# Patient Record
Sex: FEMALE | Race: WHITE | Employment: OTHER | URBAN - METROPOLITAN AREA
[De-identification: names, ages, dates, MRNs, and addresses within clinical notes are randomized per-mention and may not be internally consistent; named-entity substitution may affect disease eponyms.]

---

## 2022-12-06 RX ORDER — LORATADINE 10 MG/1
10 TABLET ORAL DAILY PRN
COMMUNITY

## 2022-12-06 RX ORDER — M-VIT,TX,IRON,MINS/CALC/FOLIC 27MG-0.4MG
1 TABLET ORAL DAILY
COMMUNITY

## 2022-12-07 ENCOUNTER — ANESTHESIA EVENT (OUTPATIENT)
Dept: OPERATING ROOM | Age: 72
End: 2022-12-07
Payer: MEDICARE

## 2022-12-07 ENCOUNTER — HOSPITAL ENCOUNTER (OUTPATIENT)
Age: 72
Setting detail: OUTPATIENT SURGERY
Discharge: HOME | End: 2022-12-07
Attending: OPHTHALMOLOGY | Admitting: OPHTHALMOLOGY
Payer: MEDICARE

## 2022-12-07 ENCOUNTER — ANESTHESIA (OUTPATIENT)
Dept: OPERATING ROOM | Age: 72
End: 2022-12-07
Payer: MEDICARE

## 2022-12-07 VITALS
OXYGEN SATURATION: 92 % | SYSTOLIC BLOOD PRESSURE: 145 MMHG | RESPIRATION RATE: 14 BRPM | TEMPERATURE: 98.6 F | BODY MASS INDEX: 27.31 KG/M2 | DIASTOLIC BLOOD PRESSURE: 66 MMHG | HEIGHT: 64 IN | WEIGHT: 160 LBS | HEART RATE: 71 BPM

## 2022-12-07 PROCEDURE — 2500000003 HC RX 250 WO HCPCS: Performed by: OPHTHALMOLOGY

## 2022-12-07 PROCEDURE — 3700000001 HC ADD 15 MINUTES (ANESTHESIA): Performed by: OPHTHALMOLOGY

## 2022-12-07 PROCEDURE — 2500000003 HC RX 250 WO HCPCS

## 2022-12-07 PROCEDURE — 2580000003 HC RX 258

## 2022-12-07 PROCEDURE — 2720000010 HC SURG SUPPLY STERILE: Performed by: OPHTHALMOLOGY

## 2022-12-07 PROCEDURE — 6360000002 HC RX W HCPCS: Performed by: OPHTHALMOLOGY

## 2022-12-07 PROCEDURE — 3600000004 HC SURGERY LEVEL 4 BASE: Performed by: OPHTHALMOLOGY

## 2022-12-07 PROCEDURE — 6370000000 HC RX 637 (ALT 250 FOR IP): Performed by: OPHTHALMOLOGY

## 2022-12-07 PROCEDURE — 7100000000 HC PACU RECOVERY - FIRST 15 MIN: Performed by: OPHTHALMOLOGY

## 2022-12-07 PROCEDURE — 6360000002 HC RX W HCPCS

## 2022-12-07 PROCEDURE — 3700000000 HC ANESTHESIA ATTENDED CARE: Performed by: OPHTHALMOLOGY

## 2022-12-07 PROCEDURE — 7100000001 HC PACU RECOVERY - ADDTL 15 MIN: Performed by: OPHTHALMOLOGY

## 2022-12-07 PROCEDURE — 2709999900 HC NON-CHARGEABLE SUPPLY: Performed by: OPHTHALMOLOGY

## 2022-12-07 PROCEDURE — 2580000003 HC RX 258: Performed by: OPHTHALMOLOGY

## 2022-12-07 PROCEDURE — 3600000014 HC SURGERY LEVEL 4 ADDTL 15MIN: Performed by: OPHTHALMOLOGY

## 2022-12-07 PROCEDURE — 7100000010 HC PHASE II RECOVERY - FIRST 15 MIN: Performed by: OPHTHALMOLOGY

## 2022-12-07 RX ORDER — CYCLOPENTOLATE HYDROCHLORIDE 10 MG/ML
1 SOLUTION/ DROPS OPHTHALMIC
Status: COMPLETED | OUTPATIENT
Start: 2022-12-07 | End: 2022-12-07

## 2022-12-07 RX ORDER — SODIUM CHLORIDE 0.9 % (FLUSH) 0.9 %
SYRINGE (ML) INJECTION PRN
Status: DISCONTINUED | OUTPATIENT
Start: 2022-12-07 | End: 2022-12-07 | Stop reason: HOSPADM

## 2022-12-07 RX ORDER — SODIUM CHLORIDE, SODIUM LACTATE, POTASSIUM CHLORIDE, CALCIUM CHLORIDE 600; 310; 30; 20 MG/100ML; MG/100ML; MG/100ML; MG/100ML
INJECTION, SOLUTION INTRAVENOUS CONTINUOUS PRN
Status: DISCONTINUED | OUTPATIENT
Start: 2022-12-07 | End: 2022-12-07 | Stop reason: SDUPTHER

## 2022-12-07 RX ORDER — SODIUM CHLORIDE 0.9 % (FLUSH) 0.9 %
5-40 SYRINGE (ML) INJECTION PRN
Status: CANCELLED | OUTPATIENT
Start: 2022-12-07

## 2022-12-07 RX ORDER — FENTANYL CITRATE 50 UG/ML
INJECTION, SOLUTION INTRAMUSCULAR; INTRAVENOUS PRN
Status: DISCONTINUED | OUTPATIENT
Start: 2022-12-07 | End: 2022-12-07 | Stop reason: SDUPTHER

## 2022-12-07 RX ORDER — LABETALOL HYDROCHLORIDE 5 MG/ML
10 INJECTION, SOLUTION INTRAVENOUS
Status: CANCELLED | OUTPATIENT
Start: 2022-12-07

## 2022-12-07 RX ORDER — CYCLOPENTOLATE HYDROCHLORIDE 10 MG/ML
SOLUTION/ DROPS OPHTHALMIC PRN
Status: DISCONTINUED | OUTPATIENT
Start: 2022-12-07 | End: 2022-12-07 | Stop reason: HOSPADM

## 2022-12-07 RX ORDER — INDOCYANINE GREEN AND WATER 25 MG
KIT INJECTION PRN
Status: DISCONTINUED | OUTPATIENT
Start: 2022-12-07 | End: 2022-12-07 | Stop reason: HOSPADM

## 2022-12-07 RX ORDER — LIDOCAINE HYDROCHLORIDE 10 MG/ML
INJECTION, SOLUTION EPIDURAL; INFILTRATION; INTRACAUDAL; PERINEURAL PRN
Status: DISCONTINUED | OUTPATIENT
Start: 2022-12-07 | End: 2022-12-07 | Stop reason: SDUPTHER

## 2022-12-07 RX ORDER — PHENYLEPHRINE HYDROCHLORIDE 100 MG/ML
1 SOLUTION/ DROPS OPHTHALMIC
Status: COMPLETED | OUTPATIENT
Start: 2022-12-07 | End: 2022-12-07

## 2022-12-07 RX ORDER — DEXTROSE MONOHYDRATE 50 MG/ML
INJECTION, SOLUTION INTRAVENOUS CONTINUOUS PRN
Status: DISCONTINUED | OUTPATIENT
Start: 2022-12-07 | End: 2022-12-07 | Stop reason: HOSPADM

## 2022-12-07 RX ORDER — PROPOFOL 10 MG/ML
INJECTION, EMULSION INTRAVENOUS PRN
Status: DISCONTINUED | OUTPATIENT
Start: 2022-12-07 | End: 2022-12-07 | Stop reason: SDUPTHER

## 2022-12-07 RX ORDER — TROPICAMIDE 10 MG/ML
1 SOLUTION/ DROPS OPHTHALMIC
Status: COMPLETED | OUTPATIENT
Start: 2022-12-07 | End: 2022-12-07

## 2022-12-07 RX ORDER — WATER 1000 ML/1000ML
INJECTION, SOLUTION INTRAVENOUS PRN
Status: DISCONTINUED | OUTPATIENT
Start: 2022-12-07 | End: 2022-12-07 | Stop reason: HOSPADM

## 2022-12-07 RX ORDER — ONDANSETRON 2 MG/ML
4 INJECTION INTRAMUSCULAR; INTRAVENOUS
Status: CANCELLED | OUTPATIENT
Start: 2022-12-07 | End: 2022-12-08

## 2022-12-07 RX ORDER — BALANCED SALT SOLUTION ENRICHED WITH BICARBONATE, DEXTROSE, AND GLUTATHIONE
KIT INTRAOCULAR PRN
Status: DISCONTINUED | OUTPATIENT
Start: 2022-12-07 | End: 2022-12-07 | Stop reason: HOSPADM

## 2022-12-07 RX ORDER — VANCOMYCIN HYDROCHLORIDE 500 MG/10ML
INJECTION, POWDER, LYOPHILIZED, FOR SOLUTION INTRAVENOUS PRN
Status: DISCONTINUED | OUTPATIENT
Start: 2022-12-07 | End: 2022-12-07 | Stop reason: HOSPADM

## 2022-12-07 RX ORDER — SODIUM CHLORIDE 9 MG/ML
INJECTION, SOLUTION INTRAVENOUS PRN
Status: CANCELLED | OUTPATIENT
Start: 2022-12-07

## 2022-12-07 RX ORDER — OFLOXACIN 3 MG/ML
1 SOLUTION/ DROPS OPHTHALMIC
Status: COMPLETED | OUTPATIENT
Start: 2022-12-07 | End: 2022-12-07

## 2022-12-07 RX ORDER — TETRACAINE HYDROCHLORIDE 5 MG/ML
SOLUTION OPHTHALMIC PRN
Status: DISCONTINUED | OUTPATIENT
Start: 2022-12-07 | End: 2022-12-07 | Stop reason: HOSPADM

## 2022-12-07 RX ORDER — SODIUM CHLORIDE 0.9 % (FLUSH) 0.9 %
5-40 SYRINGE (ML) INJECTION EVERY 12 HOURS SCHEDULED
Status: CANCELLED | OUTPATIENT
Start: 2022-12-07

## 2022-12-07 RX ORDER — HYDRALAZINE HYDROCHLORIDE 20 MG/ML
10 INJECTION INTRAMUSCULAR; INTRAVENOUS
Status: CANCELLED | OUTPATIENT
Start: 2022-12-07

## 2022-12-07 RX ORDER — NEOMYCIN SULFATE, POLYMYXIN B SULFATE, AND DEXAMETHASONE 3.5; 10000; 1 MG/G; [USP'U]/G; MG/G
OINTMENT OPHTHALMIC PRN
Status: DISCONTINUED | OUTPATIENT
Start: 2022-12-07 | End: 2022-12-07 | Stop reason: HOSPADM

## 2022-12-07 RX ADMIN — PHENYLEPHRINE HYDROCHLORIDE 1 DROP: 100 SOLUTION/ DROPS OPHTHALMIC at 06:37

## 2022-12-07 RX ADMIN — TROPICAMIDE 1 DROP: 10 SOLUTION/ DROPS OPHTHALMIC at 06:37

## 2022-12-07 RX ADMIN — LIDOCAINE HYDROCHLORIDE 50 MG: 10 INJECTION, SOLUTION EPIDURAL; INFILTRATION; INTRACAUDAL; PERINEURAL at 08:16

## 2022-12-07 RX ADMIN — PHENYLEPHRINE HYDROCHLORIDE 1 DROP: 100 SOLUTION/ DROPS OPHTHALMIC at 06:43

## 2022-12-07 RX ADMIN — TROPICAMIDE 1 DROP: 10 SOLUTION/ DROPS OPHTHALMIC at 06:50

## 2022-12-07 RX ADMIN — PROPOFOL 60 MG: 10 INJECTION, EMULSION INTRAVENOUS at 08:16

## 2022-12-07 RX ADMIN — CYCLOPENTOLATE HYDROCHLORIDE 1 DROP: 10 SOLUTION/ DROPS OPHTHALMIC at 06:50

## 2022-12-07 RX ADMIN — OFLOXACIN 1 DROP: 3 SOLUTION OPHTHALMIC at 06:43

## 2022-12-07 RX ADMIN — CYCLOPENTOLATE HYDROCHLORIDE 1 DROP: 10 SOLUTION/ DROPS OPHTHALMIC at 06:43

## 2022-12-07 RX ADMIN — FENTANYL CITRATE 25 MCG: 50 INJECTION, SOLUTION INTRAMUSCULAR; INTRAVENOUS at 09:09

## 2022-12-07 RX ADMIN — TROPICAMIDE 1 DROP: 10 SOLUTION/ DROPS OPHTHALMIC at 06:43

## 2022-12-07 RX ADMIN — CYCLOPENTOLATE HYDROCHLORIDE 1 DROP: 10 SOLUTION/ DROPS OPHTHALMIC at 06:37

## 2022-12-07 RX ADMIN — SODIUM CHLORIDE, POTASSIUM CHLORIDE, SODIUM LACTATE AND CALCIUM CHLORIDE: 600; 310; 30; 20 INJECTION, SOLUTION INTRAVENOUS at 08:05

## 2022-12-07 RX ADMIN — FENTANYL CITRATE 25 MCG: 50 INJECTION, SOLUTION INTRAMUSCULAR; INTRAVENOUS at 09:03

## 2022-12-07 RX ADMIN — PHENYLEPHRINE HYDROCHLORIDE 1 DROP: 100 SOLUTION/ DROPS OPHTHALMIC at 06:50

## 2022-12-07 RX ADMIN — PROPOFOL 40 MG: 10 INJECTION, EMULSION INTRAVENOUS at 08:18

## 2022-12-07 RX ADMIN — OFLOXACIN 1 DROP: 3 SOLUTION OPHTHALMIC at 06:37

## 2022-12-07 RX ADMIN — OFLOXACIN 1 DROP: 3 SOLUTION OPHTHALMIC at 06:50

## 2022-12-07 NOTE — H&P
History and Physical    Pt Name: Caro Rivera  MRN: [id number]  YOB: 1950  Date of evaluation: 2022  Primary Care Physician: No primary care provider on file. SUBJECTIVE:   History of Chief Complaint:    Caro Rivera is a 67 y.o. female who is scheduled today for PARS PLANA VITRECTOMY 25 GAUGE, MEMBRANE PEEL, ICG, GAS FLUID EXCHANGE - Left. She reports a few month history of left vision changes, \"shrinking\" in her central vision. She denies prior eye procedures. Allergies  is allergic to seasonal.  Medications  Prior to Admission medications    Medication Sig Start Date End Date Taking? Authorizing Provider   Multiple Vitamins-Minerals (THERAPEUTIC MULTIVITAMIN-MINERALS) tablet Take 1 tablet by mouth daily   Yes Historical Provider, MD   loratadine (CLARITIN) 10 MG tablet Take 10 mg by mouth daily as needed   Yes Historical Provider, MD     Past Medical History    has a past medical history of Macular hole of left eye, Under care of team, Wears glasses, and Wellness examination. Past Surgical History   has no past surgical history on file. Social History   reports that she has never smoked. She has never used smokeless tobacco.   reports current alcohol use. reports no history of drug use. Marital Status single  Children none  Occupation retired    Family History  Family Status   Relation Name Status    Mother      Father      Sister  Alive     family history includes Arthritis in her father; Asthma in her sister; Diabetes in her father; Hypothyroidism in her sister; Other in her sister.     Review of Systems:  CONSTITUTIONAL:   negative for fevers, chills, fatigue and malaise    EYES:   See HPI   HEENT:   negative for tinnitus, epistaxis and sore throat     RESPIRATORY:   negative for cough, shortness of breath, wheezing     CARDIOVASCULAR:   negative for chest pain, palpitations, syncope, edema     GASTROINTESTINAL:   negative for nausea, vomiting GENITOURINARY:   negative for incontinence     MUSCULOSKELETAL:   negative for neck or back pain     NEUROLOGICAL:   Negative for weakness and tingling  negative for headaches and dizziness     PSYCHIATRIC:   negative for anxiety       OBJECTIVE:   VITALS:  height is 5' 4\" (1.626 m) and weight is 160 lb (72.6 kg). Her temporal temperature is 97.3 °F (36.3 °C). Her blood pressure is 163/72 (abnormal) and her pulse is 75. Her respiration is 18 and oxygen saturation is 95%. CONSTITUTIONAL:alert & oriented x 3, no acute distress. Calm and pleasant. SKIN:  Warm and dry, no rashes on exposed areas of skin   HEAD:  Normocephalic, atraumatic   EYES: EOMs intact. Left pupil dilated s/p pre-op drops. Right pupil reactive to light  EARS:  Equal bilaterally, no edema or thickening, skin is intact without lumps or lesions. No discharge. Hearing grossly WNL. NOSE:  Nares patent. No rhinorrhea   MOUTH/THROAT:  Mucous membranes moist, tongue is pink, uvula midline, teeth appear to be intact  NECK:Full ROM  LUNGS: Respirations even and non-labored. Clear to auscultation bilaterally, no wheezes, rales, or rhonchi. CARDIOVASCULAR: Regular rate and rhythm, no murmurs/rubs/gallops   ABDOMEN: soft, non-tender, non-distended, bowel sounds active x 4   EXTREMITIES: No edema bilateral lower extremities. No varicosities bilateral lower extremities. NEUROLOGIC: CN II-XII are grossly intact. Gait not assessed.    IMPRESSIONS:   MACULAR HOLE  PLANS:   PARS PLANA VITRECTOMY 25 GAUGE, MEMBRANE PEEL, ICG, GAS FLUID EXCHANGE - Left      MAGEN ROWE CNP   Electronically signed 12/7/2022 at 7:06 AM

## 2022-12-07 NOTE — ANESTHESIA PRE PROCEDURE
Department of Anesthesiology  Preprocedure Note       Name:  Teddy Nagel   Age:  67 y.o.  :  1950                                          MRN:  [id number]         Date:  2022      Surgeon: Mike Lundberg):  Vinay Arteaga MD    Procedure: Procedure(s):  PARS PLANA VITRECTOMY 25 GAUGE, MEMBRANE PEEL, ICG, GAS FLUID EXCHANGE    Medications prior to admission:   Prior to Admission medications    Medication Sig Start Date End Date Taking? Authorizing Provider   Multiple Vitamins-Minerals (THERAPEUTIC MULTIVITAMIN-MINERALS) tablet Take 1 tablet by mouth daily   Yes Historical Provider, MD   loratadine (CLARITIN) 10 MG tablet Take 10 mg by mouth daily as needed   Yes Historical Provider, MD       Current medications:    No current facility-administered medications for this encounter. Current Outpatient Medications   Medication Sig Dispense Refill    Multiple Vitamins-Minerals (THERAPEUTIC MULTIVITAMIN-MINERALS) tablet Take 1 tablet by mouth daily      loratadine (CLARITIN) 10 MG tablet Take 10 mg by mouth daily as needed         Allergies: Allergies   Allergen Reactions    Seasonal      Springtime       Problem List:  There is no problem list on file for this patient. Past Medical History:        Diagnosis Date    Macular hole of left eye 2022    Under care of team 2022    OPTHAMOLOGY - DR. ALEXIS - last visit - 2022    Wears glasses 2022    Also wears contacts    Wellness examination 2022    No PCP currently. Past Surgical History:  No past surgical history on file.     Social History:    Social History     Tobacco Use    Smoking status: Not on file    Smokeless tobacco: Not on file   Substance Use Topics    Alcohol use: Not on file                                Counseling given: Not Answered      Vital Signs (Current):   Vitals:    22 0943   Weight: 169 lb (76.7 kg)   Height: 5' 4\" (1.626 m)                                              BP Readings from Last 3 Encounters:   No data found for BP       NPO Status:                                                                                 BMI:   Wt Readings from Last 3 Encounters:   No data found for Wt     Body mass index is 29.01 kg/m². CBC: No results found for: WBC, RBC, HGB, HCT, MCV, RDW, PLT    CMP: No results found for: NA, K, CL, CO2, BUN, CREATININE, GFRAA, AGRATIO, LABGLOM, GLUCOSE, GLU, PROT, CALCIUM, BILITOT, ALKPHOS, AST, ALT    POC Tests: No results for input(s): POCGLU, POCNA, POCK, POCCL, POCBUN, POCHEMO, POCHCT in the last 72 hours. Coags: No results found for: PROTIME, INR, APTT    HCG (If Applicable): No results found for: PREGTESTUR, PREGSERUM, HCG, HCGQUANT     ABGs: No results found for: PHART, PO2ART, YKE7ZCN, WXL1PVL, BEART, N7EPOOYS     Type & Screen (If Applicable):  No results found for: LABABO, LABRH    Drug/Infectious Status (If Applicable):  No results found for: HIV, HEPCAB    COVID-19 Screening (If Applicable): No results found for: COVID19        Anesthesia Evaluation  Patient summary reviewed and Nursing notes reviewed no history of anesthetic complications:   Airway: Mallampati: II  TM distance: >3 FB   Neck ROM: full  Mouth opening: > = 3 FB   Dental: normal exam         Pulmonary:Negative Pulmonary ROS and normal exam  breath sounds clear to auscultation                             Cardiovascular:  Exercise tolerance: good (>4 METS),   (+) hypertension:,         Rhythm: regular  Rate: normal                    Neuro/Psych:   Negative Neuro/Psych ROS              GI/Hepatic/Renal: Neg GI/Hepatic/Renal ROS            Endo/Other: Negative Endo/Other ROS                    Abdominal:             Vascular: negative vascular ROS.          Other Findings: BP (!) 163/72   Pulse 75   Temp 97.3 °F (36.3 °C) (Temporal)   Resp 18   Ht 5' 4\" (1.626 m)   Wt 160 lb (72.6 kg)   SpO2 95%   BMI 27.46 kg/m²             Anesthesia Plan      MAC     ASA 2 Induction: intravenous. MIPS: Postoperative opioids intended. Anesthetic plan and risks discussed with patient. Use of blood products discussed with patient whom consented to blood products. Plan discussed with CRNA.                     Joaquín Joshi MD   12/7/2022

## 2022-12-07 NOTE — ANESTHESIA POSTPROCEDURE EVALUATION
Department of Anesthesiology  Postprocedure Note    Patient: Sol Montenegro  MRN: [id number]  YOB: 1950  Date of evaluation: 12/7/2022      Procedure Summary     Date: 12/07/22 Room / Location: [address]    Anesthesia Start: 4508 Anesthesia Stop: 5913    Procedure: PARS PLANA VITRECTOMY 25 GAUGE, MEMBRANE PEEL, ICG, AIR FLUID EXCHANGE, AIR GAS EXCHANGE WITH 12% C3F8 (Left) Diagnosis:       Macular hole of left eye      (MACULAR HOLE)    Surgeons: Consuelo Harada, MD Responsible Provider: Chris Carlisle MD    Anesthesia Type: MAC ASA Status: 2          Anesthesia Type: No value filed.     Patricia Phase I:      Patricia Phase II: Patricia Score: 10      Anesthesia Post Evaluation    Patient location during evaluation: PACU  Patient participation: complete - patient participated  Level of consciousness: awake and alert  Pain score: 0  Airway patency: patent  Nausea & Vomiting: no nausea and no vomiting  Complications: no  Cardiovascular status: hemodynamically stable  Respiratory status: acceptable  Hydration status: euvolemic

## 2022-12-07 NOTE — BRIEF OP NOTE
Brief Postoperative Note      Patient: Home Zaman  YOB: 1950  MRN: [id number]    Date of Procedure: 12/7/2022    Pre-Op Diagnosis: MACULAR HOLE OS    Post-Op Diagnosis: Same       Procedure(s):  PARS PLANA VITRECTOMY 25 GAUGE, MEMBRANE PEEL, ICG, AIR FLUID EXCHANGE, AIR GAS EXCHANGE WITH 12% C3F8    Surgeon(s):  France Mariano MD    Assistant:  * No surgical staff found *    Anesthesia: Monitor Anesthesia Care    Estimated Blood Loss (mL): Minimal    Complications: None    Specimens:   * No specimens in log *    Implants:  * No implants in log *      Drains: * No LDAs found *    Findings:     Electronically signed by France Mariano MD on 12/7/2022 at 9:51 AM

## 2022-12-07 NOTE — BRIEF OP NOTE
Brief Postoperative Note      Patient: Rupinder Velásquez  YOB: 1950  MRN: [id number]    Date of Procedure: 12/7/2022    Pre-Op Diagnosis: MACULAR HOLE    Post-Op Diagnosis: Same       Procedure(s):  PARS PLANA VITRECTOMY 25 GAUGE, MEMBRANE PEEL, ICG, AIR FLUID EXCHANGE, AIR GAS EXCHANGE WITH 12% C3F8    Surgeon(s):  Reed Isbell MD    Assistant:  * No surgical staff found *    Anesthesia: Monitor Anesthesia Care    Estimated Blood Loss (mL): Minimal    Complications: None    Specimens:   * No specimens in log *    Implants:  * No implants in log *      Drains: * No LDAs found *    Findings:     Electronically signed by Reed Isbell MD on 12/7/2022 at 9:52 AM

## 2022-12-07 NOTE — DISCHARGE INSTRUCTIONS
Going Home Instructions and Medication Schedule  Use one drop in the operated eye(s) at each time marked with a CHECK  If you are using more than one kind of drop, allow three minutes between drops. Use Pain Medication Prescribed by your Surgeon    Positioning    [x]  Face Down     []  Left Side     []  Right Side     []  Either Side       []  Head Elevated on back at 30 deg     []  Right Side Down          Going Home Instructions and Medication Schedule  Use one drop in the operated eye(s) at each time marked with a CHECK  If you are using more than one kind of drop, allow three minutes between drops. Use Pain Medication Prescribed by your Surgeon    Positioning    []  Face Down     []  Left Side     []  Right Side     []  Either Side       []  Head Elevated on back at 30 deg     []  Right Side Down     []  Left Side Down     LOOK DOWN  FOR 24 HRS       When Cleansing the Eye:  1. Wash you hands with soap and water. 2.  Open 2 X 2 dressing and eye patch, tear 6 inches along tape. 3.  Moisten 2 X 2 dressing with sterile irrigating solution. 4.  Gently cleanse eye with one 2 X 2 from inside corner of eye to outside edge. Then        repeat with second 2 X 2. Remember to only use 2 X 2 once. When Giving Eye Drops or Ointments:  1. Cleanse eye as above before giving drops or ointments. 2.  Have the patient look toward the ceiling with both eyes open. 3.  Pull the lower lid down - steady your hand on the patient's forehead. 4.  Put a drop of medicine or a small strip of ointment in the sac behind the       lashes of the lower lid,  5. The tip of the dropper or ointment tube should not touch the eye itself. 6.  Don't give more than one eye medicine at a time. Wait about 3 minutes between           each. 7.  When using both ointment and drops, use the ointment after the drops. 8. If you put in your own drops, it may be easier to lie down.   Ask a friend or relative to     make sure you actually get the medication into the eye. General Instruction:  1. Resume all medications taken before hospitalization. 2.  You may have occasional discomfort after returning home. Take 1 - 2 regular or               extra strength acetaminophen (Tylenol) or other non-aspirin pain relievers every 4 - 6       hours if needed for pain. 3.  If you experience severe pain which is not relieved by the above measures, or if you         develop excessive discharge, please call our office. 4.  For any other problems or questions that arise, please call our office. 5. Bring all post op drops given to you after surgery to your post -op appointment tomorrow . Do not use any drops until you are given instructions  by Dr Prudencio Juan 's staff tomorrow. Please bring this instruction sheet and your medication with you to your office  Visit.     Name of Drop Cap Color 8:00 AM  Breakfast 12:00 PM  Lunch 6:00 PM  Supper 10:00 PM  Bedtime   Acular/Nevanac Brewer   []     []         []         []         Alphagan Purple    []        []         []         []         Cosopt White/Blue   []         []         []         []         Econopred Plus Kamaili   []         []         []         []         Lotemax Pink   []         []         []         []         Lumigan Teal   []         []         []         []         Ocuflox/Ciloxan Marina   []         []         []         []         Pred G White   []         []         []         []         Timolol/Betimol Yellow   []         []         []         []         Travatan/Xalatan Teal   []         []         []         []         Trusopt Orange   []         []         []         []         Vigamox Marina   []         []         []         []         ATROPINE Red   []         []         []         []         Ointment    []         []         []         [x]           Additional Instructions and Information:    1.  fI gas was put in your eye during you operation, airplane travel is not permitted until this is approved by your doctor. If you expect to undergo any procedures requiring anesthesia, including dental procedures requiring nitrous gas, notify your doctor                immediately. 2.  If the operated eye is comfortable, you may go without an eye patch. You should wear your regular glasses or sunglasses during the day, which will provide protection for the eye. If you were given an eye shield protector, wear this at night over the operate eye. 3.  Light flashes or other visula symptoms are commin during the post-operative period. Both eyes can be light sensitive and neither condition is cause for alarm. Dark glasses may be helpful in decreasing light sensitivity. Activities May Be Resumed According to the Following Schedule: Activity Time After [facility] or shower with care to keep soap away from eyes Immediately   Shaving Immediately   Careful walking outdoors with a  (weather permitting) Immediately   Reading or watching television Immediately   Riding in a car Immediately   Shampooing hair (head held back _____  Immediately         _____ 2 days   Routine household chores (no scrubbing floors or lifting heavy object)  One Week   Haircut or appointment at beauty shop _____  Immediately         _____One Week   Return to employment Ask Physician   Driving Ask Physician     Any Activity That Makes Mariia Fuss to the Head May Cause Eye Discomfort: The following activities should be avoided:  A. Lifting over 20 pounds (about a case of soda)  B. Sever physical exertion  C. Bending forward (keep the head upright and bend at the knees  D.   Straining with bowel movement (use mild laxative or zuly softener for constipation)    []  Left Side Down     LOOK DOWN  FOR 24 HRS

## 2022-12-08 NOTE — OP NOTE
[address]                  [place] [address]                                OPERATIVE REPORT    PATIENT NAME: Amira Bernal                :        1950  MED REC NO:   [id number]                             ROOM:  ACCOUNT NO:   [de-identified]                           ADMIT DATE: 2022  PROVIDER:     Jannette Dodson    DATE OF PROCEDURE:  2022    PREOPERATIVE DIAGNOSIS:  Left full-thickness macular hole. POSTOPERATIVE DIAGNOSIS:  Left full-thickness macular hole. OPERATIONS PERFORMED:  Left pars plana vitrectomy, membrane stripping,  air-fluid-gas exchange. SURGEON:  Jannette Butler. Radha Jefferson MD    COMPLICATIONS:  None. ANESTHETIC:  MAC. BLOOD LOSS:  Zero. INDICATIONS FOR SURGERY:  The above patient has a full-thickness macular  hole. The patient understands she needs surgery to improve the vision. Without it, she will become legally blind. Discussed 893% certainty of  cataract formation, need for facedown positioning. Discussed the  possibility of bleeding, infection, retinal detachment with loss of  vision, glaucoma, damage to cornea, optic neuropathy, blindness, loss of  eye, failure of the macular hole to close and possibility of reopening. OPERATIVE PROCEDURE:  Informed consent was obtained. The patient  underwent a left retrobulbar block. A total of 8 mL of 2% lidocaine was  used with 0.75% Marcaine in a 50:50 mixture without epinephrine. The  patient's left eye was then draped and prepped in the usual sterile  fashion. In anticipation for a 25-gauge surgery, Betadine was placed in  the conjunctiva as prophylaxis against infection. Measuring 4 mm  posterior to the limbus in the inferotemporal quadrant, the conjunctiva  was deflected towards the cornea by a trocar followed by the infusion  cannula in the off position.   Care was taken to ensure the infusion  cannula was seen in the vitreous cavity prior to turning the infusion  cannula on. It was then turned on to 35. Similar maneuver was done  superonasally and superotemporally by placing trocars 4 mm posterior to  the limbus. Using a BIOM system, ocutome with light pipe was placed in  the vitreous cavity. Core vitrectomy was performed. Care was taken to  avoid damage to the lens. PVD was induced fully and then, the posterior  hyaloid was removed in a posterior-anterior fashion for 360 degrees. Care was taken to avoid any damage to the lens or retina. Then, using a pancake contact lens, several drops of 0.1% ICG were  placed in the posterior pole. The ICG was created and 0.1% solution  with D5W only and no BSS, allowed to stain for approximately 5 seconds. It was then vacuumed off with the silicone-tipped extrusion needle. All  ICG was removed from the bed of the macular hole in the optic nerve. Then, using a sabiha-dusted loop, I created a rent in the ILM and a  maculorrhexis was performed with the ILM forceps to remove the entire  ILM and relieve all traction from the macular hole. At this point,  peripheral examination and scleral depression did not reveal any retinal  hole, tear, detachment, or dialysis. At this time, an air-fluid was  carried out with a silicone-tipped extrusion needle. Then, 25 mL of 12%  C3F8 gas was injected through the infusion cannula and vented out with a  27-gauge needle. The two superior trocars were removed. These were  airtight. The infusion cannula was removed. This was airtight as well. Subconjunctival vancomycin was given superiorly and inferiorly. An  antibiotic ointment was placed and the eye patched and shielded. The  patient returned to the recovery room in satisfactory condition.         Celestine Guaman    D: 12/07/2022 23:19:35       T: 12/08/2022 0:09:30     [email or web address]  Job#: [id number]     Doc#: [id number]    CC:

## (undated) DEVICE — KIT,ANTI FOG,W/SPONGE & FLUID,SOFT PACK: Brand: MEDLINE

## (undated) DEVICE — DRESSING TRNSPAR W2XL2.75IN FLM SHT SEMIPERMEABLE WIND

## (undated) DEVICE — NEEDLE HYPO 27GA L1.25IN GRY POLYPR HUB S STL REG BVL STR

## (undated) DEVICE — SOLUTION SCRB 4OZ 10% POVIDONE IOD ANTIMIC BTL

## (undated) DEVICE — SYRINGE, LUER LOCK, 10ML: Brand: MEDLINE

## (undated) DEVICE — 1 EACH 40411 STERILE DISPOSABLE SUPER VIEW® LENS SET & 1 EACH 40100 STERILE MICROSCOPE DRAPE: Brand: SUPER VIEW® PACK

## (undated) DEVICE — STANDARD HYPODERMIC NEEDLE,ALUMINUM HUB: Brand: MONOJECT

## (undated) DEVICE — REVOLUTION DSP 25G ILM FORCEPS: Brand: ALCON GRIESHABER REVOLUTION

## (undated) DEVICE — DRESSING TRNSPAR W5XL4.5IN FLM SHT SEMIPERMEABLE WIND

## (undated) DEVICE — 25+® FINESSE® FLEX LOOP DSP: Brand: ALCON GRIESHABER 25+ FINESSE

## (undated) DEVICE — SYRINGE ST FILTERS W/MCE MEMBRAN

## (undated) DEVICE — OCCUCOAT SYRINGE 1ML 6PK: Brand: OCCUCOAT

## (undated) DEVICE — HYPODERMIC SAFETY NEEDLE: Brand: MAGELLAN

## (undated) DEVICE — GLOVE SURG SZ 65 THK91MIL LTX FREE SYN POLYISOPRENE

## (undated) DEVICE — RETINA PK

## (undated) DEVICE — BLUNTFILL WITH FILTER: Brand: MONOJECT

## (undated) DEVICE — SYRINGE MED 50ML LUERLOCK TIP

## (undated) DEVICE — NEEDLE HYPO 30GA L0.5IN BGE POLYPR HUB S STL REG BVL STR

## (undated) DEVICE — SURGICAL PROCEDURE PACK 25 GA VITRECTOMY

## (undated) DEVICE — 25GA ACTU8 ADAPTIVE FORCEPS BOX OF 5: Brand: VORTEX SURGICAL INC

## (undated) DEVICE — 25GA EZPASS SOFT TIP CANNULA BOX OF 5: Brand: VORTEX SURGICAL INC